# Patient Record
Sex: MALE | Race: WHITE | NOT HISPANIC OR LATINO | Employment: FULL TIME | URBAN - NONMETROPOLITAN AREA
[De-identification: names, ages, dates, MRNs, and addresses within clinical notes are randomized per-mention and may not be internally consistent; named-entity substitution may affect disease eponyms.]

---

## 2017-01-20 ENCOUNTER — OFFICE VISIT (OUTPATIENT)
Dept: UROLOGY | Facility: CLINIC | Age: 47
End: 2017-01-20

## 2017-01-20 DIAGNOSIS — N40.1 BPH (BENIGN PROSTATIC HYPERTROPHY) WITH URINARY OBSTRUCTION: ICD-10-CM

## 2017-01-20 DIAGNOSIS — E29.1 HYPOGONADISM MALE: Primary | ICD-10-CM

## 2017-01-20 DIAGNOSIS — N13.8 BPH (BENIGN PROSTATIC HYPERTROPHY) WITH URINARY OBSTRUCTION: ICD-10-CM

## 2017-01-20 PROCEDURE — 99213 OFFICE O/P EST LOW 20 MIN: CPT | Performed by: UROLOGY

## 2017-01-20 NOTE — PROGRESS NOTES
Chief Complaint  Hypogonadism (FUP 1 MONTH WITH TESTO LEVEL.)        EVAN Lovelace is a 46 y.o. male who turns today for follow-up of hypogonadism and low testosterone level.  Month ago he had Testopel pellets inserted and returns today with a significant clinical improvement in terms of energy stamina and attitude  He is therefore anxious to continue the supplement.  He has noticed a decline in the volume of his testicles and he was fore-warned about the diminution in his endogenous production with supplement.    There were no vitals filed for this visit.    Past Medical History  Past Medical History   Diagnosis Date   • Anxiety    • Mood changes        Past Surgical History  No past surgical history on file.    Medications  has a current medication list which includes the following prescription(s): buspirone and hydroxyzine, and the following Facility-Administered Medications: testosterone cypionate.      Allergies  No Known Allergies    Social History  Social History     Social History Narrative       Family History  He has no family history of bladder or kidney cancer  He has no family history of kidney stones      AUA Symptom Score:      Review of Systems  Review of Systems   Constitutional: Negative.    Genitourinary: Negative.    All other systems reviewed and are negative.      Physical Exam  Physical Exam    Labs Recent and today in the office:  Results for orders placed or performed in visit on 10/10/16   Estradiol   Result Value Ref Range    Estradiol 23.0 pg/mL   PSA   Result Value Ref Range    PSA 0.500 0.000 - 4.000 ng/mL   Testosterone (Free & Total), LC / MS   Result Value Ref Range    Testosterone, Total 272.9 (L) 348.0 - 1197.0 ng/dL    Testosterone, Free 5.3 (L) 6.8 - 21.5 pg/mL   Luteinizing Hormone   Result Value Ref Range    LH 2.60 mIU/mL   POC Urinalysis Dipstick, Automated   Result Value Ref Range    Color Yellow Yellow, Straw, Dark Yellow, Tara    Clarity, UA Clear Clear    Glucose, UA  Negative Negative mg/dL    Bilirubin Negative Negative    Ketones, UA Negative Negative    Specific Gravity  1.030 1.005 - 1.030    Blood, UA Negative Negative    pH, Urine 5.0 5.0 - 8.0    Protein, POC Negative Negative mg/dL    Urobilinogen, UA Normal Normal    Leukocytes Negative Negative    Nitrite, UA Negative Negative         Assessment & Plan  His recent testosterone level looked great at 486 and correlates with his clinical response.  PSA is unchanged 0.5.  I suggested he return in 3 months with blood work and if appropriate he'll be due for his nextapplication of Testopel pellets.

## 2017-01-20 NOTE — MR AVS SNAPSHOT
Tari Lovelace   2017 11:00 AM   Office Visit    Dept Phone:  [phone]   Encounter #:  [id number]    Provider:  Himanshu Peoples MD   Department:  [facility] UROLOGY                Your Full Care Plan              Your Updated Medication List          This list is accurate as of: 17 11:59 AM.  Always use your most recent med list.                busPIRone 10 MG tablet   Commonly known as:  BUSPAR       hydrOXYzine 25 MG capsule   Commonly known as:  VISTARIL               You Were Diagnosed With        Codes Comments    Hypogonadism male    -  Primary ICD-10-CM: E29.1  ICD-9-CM: 257.2     BPH (benign prostatic hypertrophy) with urinary obstruction     ICD-10-CM: N40.1, N13.8  ICD-9-CM: 600.01, 599.69       Medications to be Given to You by a Medical Professional     Due       Frequency    10/10/2016 Testosterone Cypionate (DEPOTESTOTERONE CYPIONATE) injection 400 mg  Once      Instructions     None    Patient Instructions History      Upcoming Appointments     Visit Type Date Time Department    FOLLOW UP 2017 11:00 AM [facility]    FOLLOW UP 2017 10:00 AM [facility]      [facility] Signup     [facility] allows you to send messages to your doctor, view your test results, renew your prescriptions, schedule appointments, and more. To sign up, go to [email or web address] and click on the Sign Up Now link in the New User? box. Enter your [facility] Activation Code exactly as it appears below along with the last four digits of your Social Security Number and your Date of Birth () to complete the sign-up process. If you do not sign up before the expiration date, you must request a new code.    [facility] Activation Code: [email or web address]  Expires: 2/3/2017 11:59 AM    If you have questions, you can email [email or web address] or call [phone] to talk to our [facility] staff. Remember, [facility] is NOT to be used for  urgent needs. For medical emergencies, dial 911.               Other Info from Your Visit           Your Appointments     Apr 21, 2017 10:00 AM EDT   Follow Up with Himanshu Peoples MD   [facility] UROLOGY (--)    [address] [address] [phone]           Arrive 15 minutes prior to appointment.              Allergies     No Known Allergies      Reason for Visit     Hypogonadism FUP 1 MONTH WITH TESTO LEVEL.      Vital Signs     Smoking Status                   Former Smoker           Problems and Diagnoses Noted     Hypogonadism male    -  Primary    Enlarged prostate with urinary obstruction

## 2017-04-21 ENCOUNTER — OFFICE VISIT (OUTPATIENT)
Dept: UROLOGY | Facility: CLINIC | Age: 47
End: 2017-04-21

## 2017-04-21 DIAGNOSIS — E29.1 HYPOGONADISM IN MALE: ICD-10-CM

## 2017-04-21 PROCEDURE — S0189 TESTOSTERONE PELLET 75 MG: HCPCS | Performed by: UROLOGY

## 2017-04-21 PROCEDURE — 99213 OFFICE O/P EST LOW 20 MIN: CPT | Performed by: UROLOGY

## 2017-04-21 PROCEDURE — 11980 IMPLANT HORMONE PELLET(S): CPT | Performed by: UROLOGY

## 2017-04-21 NOTE — PROGRESS NOTES
"Chief Complaint  Hypogonadism (fup 3 months with labs.)        EVAN Lovelace is a 47 y.o. male who returns today for follow-up of hypogonadism with a low testosterone level who has achieved great clinical benefit from supplement.  He states that for the past few weeks he has felt like he was \"running out of gas\".  This is confirmed by his recent blood work which indicates his testosterone level is back less than 200.  He's obviously not capable of making enough testosterone on his own.  We got a nice blood level over 500 one month after the insertion of 12 Testopel pellets 4 months ago.  He's anxious to continue the treatment.  Recent estradiol and CBC were within normal limits.    There were no vitals filed for this visit.    Past Medical History  Past Medical History:   Diagnosis Date   • Anxiety    • Mood changes        Past Surgical History  No past surgical history on file.    Medications  has a current medication list which includes the following prescription(s): buspirone and hydroxyzine, and the following Facility-Administered Medications: testosterone cypionate.      Allergies  No Known Allergies    Social History  Social History     Social History Narrative       Family History  He has no family history of bladder or kidney cancer  He has no family history of kidney stones      AUA Symptom Score:      Review of Systems  Review of Systems   Constitutional: Negative.    Genitourinary: Negative.    All other systems reviewed and are negative.      Physical Exam  Physical Exam    Labs Recent and today in the office:  Results for orders placed or performed in visit on 10/10/16   Estradiol   Result Value Ref Range    Estradiol 23.0 pg/mL   PSA   Result Value Ref Range    PSA 0.500 0.000 - 4.000 ng/mL   Testosterone (Free & Total), LC / MS   Result Value Ref Range    Testosterone, Total 272.9 (L) 348.0 - 1197.0 ng/dL    Testosterone, Free 5.3 (L) 6.8 - 21.5 pg/mL   Luteinizing Hormone   Result Value Ref Range    " LH 2.60 mIU/mL   POC Urinalysis Dipstick, Automated   Result Value Ref Range    Color Yellow Yellow, Straw, Dark Yellow, Tara    Clarity, UA Clear Clear    Glucose, UA Negative Negative mg/dL    Bilirubin Negative Negative    Ketones, UA Negative Negative    Specific Gravity  1.030 1.005 - 1.030    Blood, UA Negative Negative    pH, Urine 5.0 5.0 - 8.0    Protein, POC Negative Negative mg/dL    Urobilinogen, UA Normal Normal    Leukocytes Negative Negative    Nitrite, UA Negative Negative     Testopel Subcutaneous hormone pellet implantation:    The patient was placed on the exam table in the supine position.  The upper outer quadrant of the hip was identified for insertion.  The area was then prepped with Betadine and injected with 7ml of Lidocaine 2% with Epinephrine to anesthetize superficially and then distally along the trocar tract.  A 3mm incision was made using #11 blade scalpel.  The trocar with a sharp-ended stylet was inserted into the subcutaneous tissue in line with the femur.  The sharp stylet was withdrawn and the Testopel pellets were placed into the well of the trocar.  Testopel was advanced into the tissue using blunt-ended stylet.  A total of 12 pellets were inserted. For the J-code of  the NDC # for the pellets is 00272-778-03. The trocar was removed and the incision was closed using Steri-strips.  The wound area was cleansed to remove the Betadine and was covered with Steri-strips with an outer Band-aid.  Two subcutaneous tract were developed.  Careful inspection of the area was done.            Assessment & Plan  The patient was informed of the post-procedure instructions.  He will return in  4 months with blood work to assure safety and to determine scheduling of the next insertion.

## 2017-08-25 ENCOUNTER — OFFICE VISIT (OUTPATIENT)
Dept: UROLOGY | Facility: CLINIC | Age: 47
End: 2017-08-25

## 2017-08-25 VITALS
TEMPERATURE: 98.4 F | WEIGHT: 233 LBS | DIASTOLIC BLOOD PRESSURE: 82 MMHG | OXYGEN SATURATION: 97 % | HEIGHT: 75 IN | BODY MASS INDEX: 28.97 KG/M2 | HEART RATE: 86 BPM | SYSTOLIC BLOOD PRESSURE: 137 MMHG

## 2017-08-25 DIAGNOSIS — E29.1 HYPOGONADISM IN MALE: Primary | ICD-10-CM

## 2017-08-25 DIAGNOSIS — R79.89 LOW TESTOSTERONE: ICD-10-CM

## 2017-08-25 PROCEDURE — 99213 OFFICE O/P EST LOW 20 MIN: CPT | Performed by: UROLOGY

## 2017-08-25 PROCEDURE — 96372 THER/PROPH/DIAG INJ SC/IM: CPT | Performed by: UROLOGY

## 2017-08-25 RX ORDER — ANASTROZOLE 1 MG/1
1 TABLET ORAL WEEKLY
Qty: 12 TABLET | Refills: 3 | Status: SHIPPED | OUTPATIENT
Start: 2017-08-25 | End: 2017-10-17 | Stop reason: SDUPTHER

## 2017-08-25 RX ORDER — TESTOSTERONE CYPIONATE 200 MG/ML
400 INJECTION, SOLUTION INTRAMUSCULAR ONCE
Status: COMPLETED | OUTPATIENT
Start: 2017-08-25 | End: 2017-08-25

## 2017-08-25 RX ADMIN — TESTOSTERONE CYPIONATE 400 MG: 200 INJECTION, SOLUTION INTRAMUSCULAR at 13:52

## 2017-08-25 NOTE — PROGRESS NOTES
Chief Complaint  Hypogonadism (4 month fup.)        EVAN Lovelace is a 47 y.o. male who returns today for follow-up with a history of hypogonadism and low testosterone level.  He received atrophic clinical benefit from supplement with Testopel pellets in the past with normal blood work indicating little risk.  Unfortunately he's lost his health insurance and his pain cast for everything.  He did not get his blood work prior to this appointment since it was $600.  He actually has not received any testosterone ever since his last pellet injection months ago.  He states his symptoms have returned with a vengeance and he can hardly stay awake and has no energy.    Vitals:    08/25/17 1024   BP: 137/82   Pulse: 86   Temp: 98.4 °F (36.9 °C)   SpO2: 97%       Past Medical History  Past Medical History:   Diagnosis Date   • Anxiety    • Mood changes        Past Surgical History  No past surgical history on file.    Medications  has a current medication list which includes the following prescription(s): buspirone and hydroxyzine, and the following Facility-Administered Medications: testosterone cypionate.      Allergies  No Known Allergies    Social History  Social History     Social History Narrative       Family History  He has no family history of bladder or kidney cancer  He has no family history of kidney stones      AUA Symptom Score:      Review of Systems  Review of Systems   Constitutional: Positive for activity change and fatigue.   Genitourinary: Negative.    All other systems reviewed and are negative.      Physical Exam  Physical Exam    Labs Recent and today in the office:  Results for orders placed or performed in visit on 10/10/16   Estradiol   Result Value Ref Range    Estradiol 23.0 pg/mL   PSA   Result Value Ref Range    PSA 0.500 0.000 - 4.000 ng/mL   Testosterone (Free & Total), LC / MS   Result Value Ref Range    Testosterone, Total 272.9 (L) 348.0 - 1197.0 ng/dL    Testosterone, Free 5.3 (L) 6.8 - 21.5  pg/mL   Luteinizing Hormone   Result Value Ref Range    LH 2.60 mIU/mL   POC Urinalysis Dipstick, Automated   Result Value Ref Range    Color Yellow Yellow, Straw, Dark Yellow, Tara    Clarity, UA Clear Clear    Glucose, UA Negative Negative mg/dL    Bilirubin Negative Negative    Ketones, UA Negative Negative    Specific Gravity  1.030 1.005 - 1.030    Blood, UA Negative Negative    pH, Urine 5.0 5.0 - 8.0    Protein, POC Negative Negative mg/dL    Urobilinogen, UA Normal Normal    Leukocytes Negative Negative    Nitrite, UA Negative Negative         Assessment & Plan  He will start receiving Depo-testosterone injections through the office and return in 3 months for follow-up.  He has insurance coverage will perform blood work then but otherwise which are probably wait 6.  I suggested going ahead and starting Arimidex to prevent an elevated estrogen level.  I suggested he be a blood donor to prevent polycythemia.

## 2017-09-12 DIAGNOSIS — E29.1 HYPOGONADISM IN MALE: ICD-10-CM

## 2017-09-12 DIAGNOSIS — R79.89 LOW TESTOSTERONE: Primary | ICD-10-CM

## 2017-09-12 RX ORDER — TESTOSTERONE CYPIONATE 200 MG/ML
400 INJECTION, SOLUTION INTRAMUSCULAR
Status: DISCONTINUED | OUTPATIENT
Start: 2017-09-12 | End: 2017-09-13

## 2017-09-13 ENCOUNTER — CLINICAL SUPPORT (OUTPATIENT)
Dept: UROLOGY | Facility: CLINIC | Age: 47
End: 2017-09-13

## 2017-09-13 ENCOUNTER — TELEPHONE (OUTPATIENT)
Dept: UROLOGY | Facility: CLINIC | Age: 47
End: 2017-09-13

## 2017-09-13 DIAGNOSIS — E29.1 HYPOGONADISM MALE: ICD-10-CM

## 2017-09-13 DIAGNOSIS — E29.1 HYPOGONADISM IN MALE: Primary | ICD-10-CM

## 2017-09-13 PROCEDURE — 96372 THER/PROPH/DIAG INJ SC/IM: CPT | Performed by: UROLOGY

## 2017-09-13 RX ORDER — TESTOSTERONE CYPIONATE 200 MG/ML
400 INJECTION, SOLUTION INTRAMUSCULAR
Qty: 20 ML | Refills: 1 | Status: SHIPPED | OUTPATIENT
Start: 2017-09-13 | End: 2017-09-23 | Stop reason: SDUPTHER

## 2017-09-13 RX ADMIN — TESTOSTERONE CYPIONATE 400 MG: 200 INJECTION, SOLUTION INTRAMUSCULAR at 09:34

## 2017-09-23 DIAGNOSIS — E29.1 HYPOGONADISM IN MALE: ICD-10-CM

## 2017-09-23 RX ORDER — TESTOSTERONE CYPIONATE 200 MG/ML
400 INJECTION, SOLUTION INTRAMUSCULAR
Qty: 20 ML | Refills: 1 | Status: SHIPPED | OUTPATIENT
Start: 2017-09-23 | End: 2018-01-04 | Stop reason: SDUPTHER

## 2017-10-17 DIAGNOSIS — E29.1 HYPOGONADISM IN MALE: ICD-10-CM

## 2017-10-17 RX ORDER — ANASTROZOLE 1 MG/1
1 TABLET ORAL WEEKLY
Qty: 12 TABLET | Refills: 3 | Status: SHIPPED | OUTPATIENT
Start: 2017-10-17 | End: 2017-11-28 | Stop reason: SDUPTHER

## 2017-11-28 ENCOUNTER — OFFICE VISIT (OUTPATIENT)
Dept: UROLOGY | Facility: CLINIC | Age: 47
End: 2017-11-28

## 2017-11-28 VITALS
HEIGHT: 75 IN | TEMPERATURE: 98.6 F | HEART RATE: 86 BPM | BODY MASS INDEX: 28.97 KG/M2 | WEIGHT: 233 LBS | OXYGEN SATURATION: 97 % | DIASTOLIC BLOOD PRESSURE: 78 MMHG | SYSTOLIC BLOOD PRESSURE: 134 MMHG

## 2017-11-28 DIAGNOSIS — R79.89 LOW TESTOSTERONE: ICD-10-CM

## 2017-11-28 DIAGNOSIS — E29.1 HYPOGONADISM IN MALE: Primary | ICD-10-CM

## 2017-11-28 LAB
BILIRUB BLD-MCNC: NEGATIVE MG/DL
CLARITY, POC: CLEAR
COLOR UR: YELLOW
GLUCOSE UR STRIP-MCNC: NEGATIVE MG/DL
KETONES UR QL: NEGATIVE
LEUKOCYTE EST, POC: NEGATIVE
NITRITE UR-MCNC: NEGATIVE MG/ML
PH UR: 6 [PH] (ref 5–8)
PROT UR STRIP-MCNC: ABNORMAL MG/DL
RBC # UR STRIP: NEGATIVE /UL
SP GR UR: 1.03 (ref 1–1.03)
UROBILINOGEN UR QL: ABNORMAL

## 2017-11-28 PROCEDURE — 99213 OFFICE O/P EST LOW 20 MIN: CPT | Performed by: UROLOGY

## 2017-11-28 RX ORDER — CYANOCOBALAMIN 1000 UG/ML
INJECTION, SOLUTION INTRAMUSCULAR; SUBCUTANEOUS
Refills: 0 | COMMUNITY
Start: 2017-11-15

## 2017-11-28 RX ORDER — DULOXETIN HYDROCHLORIDE 20 MG/1
20 CAPSULE, DELAYED RELEASE ORAL DAILY
COMMUNITY

## 2017-11-28 RX ORDER — ANASTROZOLE 1 MG/1
1 TABLET ORAL WEEKLY
Qty: 12 TABLET | Refills: 3 | Status: SHIPPED | OUTPATIENT
Start: 2017-11-28 | End: 2018-04-02 | Stop reason: SDUPTHER

## 2017-11-28 RX ORDER — DULOXETIN HYDROCHLORIDE 30 MG/1
CAPSULE, DELAYED RELEASE ORAL
Refills: 2 | COMMUNITY
Start: 2017-11-15

## 2017-11-28 RX ORDER — SYRINGE WITH NEEDLE, 1 ML 25GX5/8"
SYRINGE, EMPTY DISPOSABLE MISCELLANEOUS
Refills: 0 | COMMUNITY
Start: 2017-11-15

## 2017-11-28 NOTE — PROGRESS NOTES
"Chief Complaint  Hypogonadism (Patient is here for 3 month fup with labs.) and low test        HPI  Radu is a 47 y.o. male who returns today for follow-up of hypogonadism and low testosterone level receiving a great clinical benefit from supplement and anxious to continue.  He's had problems with his insurance and finances are a concern.  He is currently self administering Depo-testosterone 400 mg every 21 days.  Recent blood work indicates it is safe to continue the supplement with the exception of polycythemia.  With a hemoglobin of 17.6 I have recommended a therapeutic phlebotomy or blood donation.     Vitals:    11/28/17 1011   BP: 134/78   Pulse: 86   Temp: 98.6 °F (37 °C)   SpO2: 97%       Past Medical History  Past Medical History:   Diagnosis Date   • Anxiety    • Mood changes        Past Surgical History  No past surgical history on file.    Medications  has a current medication list which includes the following prescription(s): duloxetine, anastrozole, b-d 3cc luer-lisa syr 25gx1/2\", buspirone, cyanocobalamin, duloxetine, hydroxyzine, and testosterone cypionate.      Allergies  No Known Allergies    Social History  Social History     Social History Narrative       Family History  He has no family history of bladder or kidney cancer  He has no family history of kidney stones      AUA Symptom Score:      Review of Systems  Review of Systems   Constitutional: Negative.    Genitourinary: Negative.    All other systems reviewed and are negative.      Physical Exam  Physical Exam    Labs Recent and today in the office:  Results for orders placed or performed in visit on 11/28/17   POC Urinalysis Dipstick, Automated   Result Value Ref Range    Color Yellow Yellow, Straw, Dark Yellow, Tara    Clarity, UA Clear Clear    Glucose, UA Negative Negative, 1000 mg/dL (3+) mg/dL    Bilirubin Negative Negative    Ketones, UA Negative Negative    Specific Gravity  1.030 1.005 - 1.030    Blood, UA Negative Negative    pH, " Urine 6.0 5.0 - 8.0    Protein, POC Trace (A) Negative mg/dL    Urobilinogen, UA 1 E.U./dL  (A) Normal    Leukocytes Negative Negative    Nitrite, UA Negative Negative         Assessment & Plan  Hypogonadism: He will donated positive blood or return for therapeutic phlebotomy but otherwise can continue the self-administered Depo-testosterone 400 mg every 21 days.  He'll also continue the Arimidex 1 mg weekly to prevent the conversion of estradiol.

## 2017-12-07 ENCOUNTER — LAB (OUTPATIENT)
Dept: LAB | Facility: HOSPITAL | Age: 47
End: 2017-12-07
Attending: UROLOGY

## 2017-12-07 DIAGNOSIS — D75.1 POLYCYTHEMIA: Primary | ICD-10-CM

## 2017-12-07 LAB
DEPRECATED RDW RBC AUTO: 42.9 FL (ref 37–54)
ERYTHROCYTE [DISTWIDTH] IN BLOOD BY AUTOMATED COUNT: 13.2 % (ref 11.5–14.5)
HCT VFR BLD AUTO: 50 % (ref 42–52)
HGB BLD-MCNC: 16.6 G/DL (ref 14–18)
MCH RBC QN AUTO: 29.6 PG (ref 27–31)
MCHC RBC AUTO-ENTMCNC: 33.2 G/DL (ref 30–37)
MCV RBC AUTO: 89.1 FL (ref 80–94)
PLATELET # BLD AUTO: 242 10*3/MM3 (ref 130–400)
PMV BLD AUTO: 9 FL (ref 6–12)
POST-BLOOD PRESSURE: NORMAL
PRE-BLOOD PRESSURE: NORMAL
PRE-HCT: 50
PRE-HGB: 16.6
PULSE: 67
RBC # BLD AUTO: 5.61 10*6/MM3 (ref 4.7–6.1)
VOLUME COLLECTED: NORMAL
WBC NRBC COR # BLD: 6.09 10*3/MM3 (ref 4.8–10.8)

## 2017-12-07 PROCEDURE — 99195 PHLEBOTOMY: CPT | Performed by: UROLOGY

## 2017-12-07 PROCEDURE — 36415 COLL VENOUS BLD VENIPUNCTURE: CPT

## 2017-12-07 PROCEDURE — 85027 COMPLETE CBC AUTOMATED: CPT

## 2018-01-04 ENCOUNTER — TELEPHONE (OUTPATIENT)
Dept: UROLOGY | Facility: CLINIC | Age: 48
End: 2018-01-04

## 2018-01-04 DIAGNOSIS — E29.1 HYPOGONADISM IN MALE: ICD-10-CM

## 2018-01-04 RX ORDER — TESTOSTERONE CYPIONATE 200 MG/ML
400 INJECTION, SOLUTION INTRAMUSCULAR
Qty: 20 ML | Refills: 1 | Status: SHIPPED | OUTPATIENT
Start: 2018-01-04 | End: 2018-04-02 | Stop reason: SDUPTHER

## 2018-01-04 NOTE — TELEPHONE ENCOUNTER
Pt requesting refills of his depo testosterone refilled   . He is not due to come back until march and needs medication refilled please

## 2018-03-26 DIAGNOSIS — E29.1 TESTICULAR HYPOGONADISM: Primary | ICD-10-CM

## 2018-03-26 DIAGNOSIS — Z12.5 SCREENING FOR PROSTATE CANCER: ICD-10-CM

## 2018-03-28 ENCOUNTER — LAB (OUTPATIENT)
Dept: LAB | Facility: HOSPITAL | Age: 48
End: 2018-03-28
Attending: UROLOGY

## 2018-03-28 DIAGNOSIS — Z12.5 SCREENING FOR PROSTATE CANCER: ICD-10-CM

## 2018-03-28 DIAGNOSIS — E29.1 TESTICULAR HYPOGONADISM: ICD-10-CM

## 2018-03-28 LAB
BASOPHILS # BLD AUTO: 0.08 10*3/MM3 (ref 0–0.2)
BASOPHILS NFR BLD AUTO: 0.8 % (ref 0–2.5)
DEPRECATED RDW RBC AUTO: 44.6 FL (ref 37–54)
EOSINOPHIL # BLD AUTO: 0.22 10*3/MM3 (ref 0–0.7)
EOSINOPHIL NFR BLD AUTO: 2.3 % (ref 0–7)
ERYTHROCYTE [DISTWIDTH] IN BLOOD BY AUTOMATED COUNT: 13.8 % (ref 11.5–14.5)
HCT VFR BLD AUTO: 49.3 % (ref 42–52)
HGB BLD-MCNC: 16.4 G/DL (ref 14–18)
IMM GRANULOCYTES # BLD: 0.03 10*3/MM3 (ref 0–0.06)
IMM GRANULOCYTES NFR BLD: 0.3 % (ref 0–0.6)
LYMPHOCYTES # BLD AUTO: 2.72 10*3/MM3 (ref 0.6–3.4)
LYMPHOCYTES NFR BLD AUTO: 28.3 % (ref 10–50)
MCH RBC QN AUTO: 29.7 PG (ref 27–31)
MCHC RBC AUTO-ENTMCNC: 33.3 G/DL (ref 30–37)
MCV RBC AUTO: 89.3 FL (ref 80–94)
MONOCYTES # BLD AUTO: 0.98 10*3/MM3 (ref 0–0.9)
MONOCYTES NFR BLD AUTO: 10.2 % (ref 0–12)
NEUTROPHILS # BLD AUTO: 5.58 10*3/MM3 (ref 2–6.9)
NEUTROPHILS NFR BLD AUTO: 58.1 % (ref 37–80)
NRBC BLD MANUAL-RTO: 0 /100 WBC (ref 0–0)
PLATELET # BLD AUTO: 288 10*3/MM3 (ref 130–400)
PMV BLD AUTO: 9.6 FL (ref 6–12)
PSA SERPL-MCNC: 1.35 NG/ML (ref 0.06–4)
RBC # BLD AUTO: 5.52 10*6/MM3 (ref 4.7–6.1)
WBC NRBC COR # BLD: 9.61 10*3/MM3 (ref 4.8–10.8)

## 2018-03-28 PROCEDURE — 36415 COLL VENOUS BLD VENIPUNCTURE: CPT

## 2018-03-28 PROCEDURE — 85025 COMPLETE CBC W/AUTO DIFF WBC: CPT

## 2018-03-28 PROCEDURE — 82670 ASSAY OF TOTAL ESTRADIOL: CPT

## 2018-03-28 PROCEDURE — 84403 ASSAY OF TOTAL TESTOSTERONE: CPT

## 2018-03-28 PROCEDURE — 84153 ASSAY OF PSA TOTAL: CPT

## 2018-03-29 LAB
ESTRADIOL SERPL HS-MCNC: 79.4 PG/ML (ref 7.6–42.6)
TESTOST SERPL-MCNC: 892 NG/DL (ref 264–916)

## 2018-04-02 ENCOUNTER — OFFICE VISIT (OUTPATIENT)
Dept: UROLOGY | Facility: CLINIC | Age: 48
End: 2018-04-02

## 2018-04-02 VITALS
OXYGEN SATURATION: 98 % | WEIGHT: 233 LBS | HEIGHT: 75 IN | TEMPERATURE: 98.3 F | BODY MASS INDEX: 28.97 KG/M2 | SYSTOLIC BLOOD PRESSURE: 128 MMHG | HEART RATE: 77 BPM | DIASTOLIC BLOOD PRESSURE: 74 MMHG

## 2018-04-02 DIAGNOSIS — E28.0 ESTRADIOL EXCESS: ICD-10-CM

## 2018-04-02 DIAGNOSIS — E29.1 HYPOGONADISM IN MALE: Primary | ICD-10-CM

## 2018-04-02 DIAGNOSIS — N52.9 ERECTILE DYSFUNCTION, UNSPECIFIED ERECTILE DYSFUNCTION TYPE: ICD-10-CM

## 2018-04-02 DIAGNOSIS — D75.1 POLYCYTHEMIA: ICD-10-CM

## 2018-04-02 LAB
BILIRUB BLD-MCNC: NEGATIVE MG/DL
CLARITY, POC: CLEAR
COLOR UR: YELLOW
GLUCOSE UR STRIP-MCNC: NEGATIVE MG/DL
KETONES UR QL: NEGATIVE
LEUKOCYTE EST, POC: NEGATIVE
NITRITE UR-MCNC: NEGATIVE MG/ML
PH UR: 6 [PH] (ref 5–8)
PROT UR STRIP-MCNC: NEGATIVE MG/DL
RBC # UR STRIP: NEGATIVE /UL
SP GR UR: 1.03 (ref 1–1.03)
UROBILINOGEN UR QL: NORMAL

## 2018-04-02 PROCEDURE — 99214 OFFICE O/P EST MOD 30 MIN: CPT | Performed by: UROLOGY

## 2018-04-02 RX ORDER — ANASTROZOLE 1 MG/1
1 TABLET ORAL 2 TIMES WEEKLY
Qty: 24 TABLET | Refills: 3 | Status: SHIPPED | OUTPATIENT
Start: 2018-04-02 | End: 2018-12-21 | Stop reason: SDUPTHER

## 2018-04-02 RX ORDER — TESTOSTERONE CYPIONATE 200 MG/ML
400 INJECTION, SOLUTION INTRAMUSCULAR
Qty: 20 ML | Refills: 1 | Status: SHIPPED | OUTPATIENT
Start: 2018-04-02 | End: 2018-12-21 | Stop reason: SDUPTHER

## 2018-04-02 NOTE — PROGRESS NOTES
"Chief Complaint  Hypogonadism (3 month fup with labs)        EVAN Lovelace is a 48 y.o. male who returns today for follow-up of hypogonadism and low testosterone level.  He is also having problems with erectile dysfunction after going through divorce and December.  He has been taking Cymbalta which caused ejaculatory dysfunction and he's been able to discontinue that.  He is continuing his Depo-testosterone self injections at home 400 mg every 21 days with good effect.  He is anxious to continue the supplement.  Once again we have reviewed the risks and benefits of androgen supplement and the need to monitor him for toxicity.  He ran out of his Arimidex and his estrogen level is significantly elevated so the importance of taking this is reviewed.  He also has a problem for polycythemia that required periodic blood donation.    Vitals:    04/02/18 0918   BP: 128/74   Pulse: 77   Temp: 98.3 °F (36.8 °C)   SpO2: 98%       Past Medical History  Past Medical History:   Diagnosis Date   • Anxiety    • Mood changes        Past Surgical History  No past surgical history on file.    Medications  has a current medication list which includes the following prescription(s): anastrozole, b-d 3cc luer-lisa syr 25gx1/2\", buspirone, cyanocobalamin, duloxetine, duloxetine, hydroxyzine, and testosterone cypionate.      Allergies  No Known Allergies    Social History  Social History     Social History Narrative   • No narrative on file       Family History  He has no family history of bladder or kidney cancer  He has no family history of kidney stones      AUA Symptom Score:      Review of Systems  Review of Systems    Physical Exam  Physical Exam   Constitutional: He is oriented to person, place, and time. He appears well-developed and well-nourished.   HENT:   Head: Normocephalic and atraumatic.   Neck: Normal range of motion.   Pulmonary/Chest: Effort normal. No respiratory distress.   Abdominal: Soft. He exhibits no distension and no " mass. There is no tenderness. No hernia.   Genitourinary: Rectum normal and prostate normal.   Musculoskeletal: Normal range of motion.   Lymphadenopathy:     He has no cervical adenopathy.   Neurological: He is alert and oriented to person, place, and time.   Skin: Skin is warm and dry.   Psychiatric: He has a normal mood and affect. His behavior is normal.   Vitals reviewed.      Labs Recent and today in the office:  Results for orders placed or performed in visit on 04/02/18   POC Urinalysis Dipstick, Automated   Result Value Ref Range    Color Yellow Yellow, Straw, Dark Yellow, Tara    Clarity, UA Clear Clear    Glucose, UA Negative Negative, 1000 mg/dL (3+) mg/dL    Bilirubin Negative Negative    Ketones, UA Negative Negative    Specific Gravity  1.030 1.005 - 1.030    Blood, UA Negative Negative    pH, Urine 6.0 5.0 - 8.0    Protein, POC Negative Negative mg/dL    Urobilinogen, UA Normal Normal    Leukocytes Negative Negative    Nitrite, UA Negative Negative         Assessment & Plan  1 hypogonadism: The need to monitor for toxicity is reviewed in detail with the patient.  He'll continue his self injections 400 mg every 21 days. He is instructed to discontinue his testosterone anytime he runs out of Arimidex.  This is prescribed at 2 times per week for the next month and then he'll return to 1 mg weekly.     He is instructed to discontinue his testosterone anytime he runs out of Arimidex.  This is prescribed at 2 times per week for the next month and then he'll return to 1 mg weekly.    #2 estradiol excess: He'll take Arimidex 1 mg 2 times per week.    #3 polycythemia: Currently his blood count is okay but he'll continue to be a blood donor to [facility].    #4 erectile dysfunction/ejaculatory dysfunction: We reviewed the likelihood of psychological factors place since this happened with new sexual contacts immediately after divorce.  His ejaculation is improved by stopping Cymbalta.

## 2018-07-05 ENCOUNTER — OFFICE VISIT (OUTPATIENT)
Dept: UROLOGY | Facility: CLINIC | Age: 48
End: 2018-07-05

## 2018-07-05 VITALS
SYSTOLIC BLOOD PRESSURE: 126 MMHG | TEMPERATURE: 98.2 F | DIASTOLIC BLOOD PRESSURE: 77 MMHG | HEART RATE: 85 BPM | OXYGEN SATURATION: 99 % | RESPIRATION RATE: 16 BRPM

## 2018-07-05 DIAGNOSIS — E29.1 TESTICULAR HYPOGONADISM: Primary | ICD-10-CM

## 2018-07-05 DIAGNOSIS — N52.01 ERECTILE DYSFUNCTION DUE TO ARTERIAL INSUFFICIENCY: ICD-10-CM

## 2018-07-05 LAB
BILIRUB BLD-MCNC: NEGATIVE MG/DL
CLARITY, POC: CLEAR
COLOR UR: YELLOW
GLUCOSE UR STRIP-MCNC: NEGATIVE MG/DL
KETONES UR QL: NEGATIVE
LEUKOCYTE EST, POC: NEGATIVE
NITRITE UR-MCNC: NEGATIVE MG/ML
PH UR: 6 [PH] (ref 5–8)
PROT UR STRIP-MCNC: NEGATIVE MG/DL
RBC # UR STRIP: NEGATIVE /UL
SP GR UR: 1.02 (ref 1–1.03)
UROBILINOGEN UR QL: NORMAL

## 2018-07-05 PROCEDURE — 99214 OFFICE O/P EST MOD 30 MIN: CPT | Performed by: UROLOGY

## 2018-07-05 RX ORDER — SILDENAFIL CITRATE 20 MG/1
60 TABLET ORAL DAILY PRN
Qty: 30 TABLET | Refills: 11 | Status: SHIPPED | OUTPATIENT
Start: 2018-07-05 | End: 2018-12-21 | Stop reason: SDUPTHER

## 2018-07-05 NOTE — PROGRESS NOTES
"Chief Complaint  Hypogonadism (3 months with labs.)        EVAN Lovelace is a 48 y.o. male who up of hypogonadism and low testosterone level achieving a good results from self injections at home of Depo-testosterone.  He did have a problem with elevated estradiol but after changing his dose on Arimidex it has dropped from 80 down to 10.    New concern today is erectile dysfunction:The patient is informed that the most common cause of erectile dysfunction is insufficient blood flow.  Atherosclerosis is extremely common in Americans who eat a diet high in animal fat and get insufficient exercise.  Patients who have hyperlipidemia and smoke cigarettes compound this affect.  The concept of diminished inflow from arterial sclerosis as well as diminished effectiveness of venous polsters that retain the blood in the cavernous spaces are presented to the patient.  The need for smoking cessation and eating a heart healthy diet along with increased physical activity is recommended in addition to the specific treatments for erectile dysfunction and hyperlipidemia.    Vitals:    07/05/18 0835   BP: 126/77   Pulse: 85   Resp: 16   Temp: 98.2 °F (36.8 °C)   SpO2: 99%       Past Medical History  Past Medical History:   Diagnosis Date   • Anxiety    • Mood changes (CMS/HCC)        Past Surgical History  No past surgical history on file.    Medications  has a current medication list which includes the following prescription(s): anastrozole, b-d 3cc luer-lisa syr 25gx1/2\", buspirone, cyanocobalamin, duloxetine, duloxetine, hydroxyzine, and testosterone cypionate.      Allergies  No Known Allergies    Social History  Social History     Social History Narrative   • No narrative on file       Family History  He has no family history of bladder or kidney cancer  He has no family history of kidney stones      AUA Symptom Score:      Review of Systems  Review of Systems   Constitutional: Negative.    Genitourinary: Negative.    All other " systems reviewed and are negative.      Physical Exam  Physical Exam    Labs Recent and today in the office:  Results for orders placed or performed in visit on 07/05/18   POC Urinalysis Dipstick, Automated   Result Value Ref Range    Color Yellow Yellow, Straw, Dark Yellow, Tara    Clarity, UA Clear Clear    Specific Gravity  1.020 1.005 - 1.030    pH, Urine 6.0 5.0 - 8.0    Leukocytes Negative Negative    Nitrite, UA Negative Negative    Protein, POC Negative Negative mg/dL    Glucose, UA Negative Negative, 1000 mg/dL (3+) mg/dL    Ketones, UA Negative Negative    Urobilinogen, UA Normal Normal    Bilirubin Negative Negative    Blood, UA Negative Negative         Assessment & Plan  #1 hypogonadism: At this point blood work indicates risk is minimal and it appears safe for him to continue the supplement and he is anxious to do so.  Continue the Arimidex 1 mg weekly and the self injections.    #2 erectile dysfunction: He is given an appointment to see Carlos to order an artificial erection device and generic sildenafil is prescribed.

## 2018-12-14 ENCOUNTER — LAB (OUTPATIENT)
Dept: LAB | Facility: HOSPITAL | Age: 48
End: 2018-12-14
Attending: UROLOGY

## 2018-12-14 DIAGNOSIS — E29.1 TESTICULAR HYPOGONADISM: ICD-10-CM

## 2018-12-14 DIAGNOSIS — Z12.5 SCREENING PSA (PROSTATE SPECIFIC ANTIGEN): ICD-10-CM

## 2018-12-14 LAB
BASOPHILS # BLD AUTO: 0.05 10*3/MM3 (ref 0–0.2)
BASOPHILS NFR BLD AUTO: 0.8 % (ref 0–2.5)
DEPRECATED RDW RBC AUTO: 42.3 FL (ref 37–54)
EOSINOPHIL # BLD AUTO: 0.13 10*3/MM3 (ref 0–0.7)
EOSINOPHIL NFR BLD AUTO: 2.2 % (ref 0–7)
ERYTHROCYTE [DISTWIDTH] IN BLOOD BY AUTOMATED COUNT: 13.1 % (ref 11.5–14.5)
HCT VFR BLD AUTO: 50 % (ref 42–52)
HGB BLD-MCNC: 17.1 G/DL (ref 14–18)
IMM GRANULOCYTES # BLD: 0.01 10*3/MM3 (ref 0–0.06)
IMM GRANULOCYTES NFR BLD: 0.2 % (ref 0–0.6)
LYMPHOCYTES # BLD AUTO: 2.32 10*3/MM3 (ref 0.6–3.4)
LYMPHOCYTES NFR BLD AUTO: 39.3 % (ref 10–50)
MCH RBC QN AUTO: 30.1 PG (ref 27–31)
MCHC RBC AUTO-ENTMCNC: 34.2 G/DL (ref 30–37)
MCV RBC AUTO: 88 FL (ref 80–94)
MONOCYTES # BLD AUTO: 0.36 10*3/MM3 (ref 0–0.9)
MONOCYTES NFR BLD AUTO: 6.1 % (ref 0–12)
NEUTROPHILS # BLD AUTO: 3.04 10*3/MM3 (ref 2–6.9)
NEUTROPHILS NFR BLD AUTO: 51.4 % (ref 37–80)
NRBC BLD MANUAL-RTO: 0 /100 WBC (ref 0–0)
PLATELET # BLD AUTO: 217 10*3/MM3 (ref 130–400)
PMV BLD AUTO: 9.7 FL (ref 6–12)
PSA SERPL-MCNC: 0.86 NG/ML (ref 0.06–4)
RBC # BLD AUTO: 5.68 10*6/MM3 (ref 4.7–6.1)
WBC NRBC COR # BLD: 5.91 10*3/MM3 (ref 4.8–10.8)

## 2018-12-14 PROCEDURE — 85025 COMPLETE CBC W/AUTO DIFF WBC: CPT

## 2018-12-14 PROCEDURE — G0103 PSA SCREENING: HCPCS

## 2018-12-14 PROCEDURE — 84403 ASSAY OF TOTAL TESTOSTERONE: CPT

## 2018-12-14 PROCEDURE — 82670 ASSAY OF TOTAL ESTRADIOL: CPT

## 2018-12-15 LAB
ESTRADIOL SERPL HS-MCNC: <5 PG/ML
TESTOST SERPL-MCNC: 545 NG/DL (ref 8–48)

## 2018-12-21 ENCOUNTER — OFFICE VISIT (OUTPATIENT)
Dept: UROLOGY | Facility: CLINIC | Age: 48
End: 2018-12-21

## 2018-12-21 VITALS
HEIGHT: 75 IN | SYSTOLIC BLOOD PRESSURE: 130 MMHG | DIASTOLIC BLOOD PRESSURE: 84 MMHG | HEART RATE: 84 BPM | OXYGEN SATURATION: 98 % | WEIGHT: 233 LBS | BODY MASS INDEX: 28.97 KG/M2

## 2018-12-21 DIAGNOSIS — E28.0 ESTRADIOL EXCESS: ICD-10-CM

## 2018-12-21 DIAGNOSIS — N52.01 ERECTILE DYSFUNCTION DUE TO ARTERIAL INSUFFICIENCY: ICD-10-CM

## 2018-12-21 DIAGNOSIS — D75.1 POLYCYTHEMIA: ICD-10-CM

## 2018-12-21 DIAGNOSIS — E29.1 TESTICULAR HYPOGONADISM: Primary | ICD-10-CM

## 2018-12-21 DIAGNOSIS — Z12.5 SCREENING PSA (PROSTATE SPECIFIC ANTIGEN): ICD-10-CM

## 2018-12-21 DIAGNOSIS — E29.1 HYPOGONADISM IN MALE: ICD-10-CM

## 2018-12-21 LAB
BILIRUB BLD-MCNC: NEGATIVE MG/DL
CLARITY, POC: CLEAR
COLOR UR: YELLOW
GLUCOSE UR STRIP-MCNC: NEGATIVE MG/DL
KETONES UR QL: NEGATIVE
LEUKOCYTE EST, POC: NEGATIVE
NITRITE UR-MCNC: NEGATIVE MG/ML
PH UR: 6.5 [PH] (ref 5–8)
PROT UR STRIP-MCNC: NEGATIVE MG/DL
RBC # UR STRIP: NEGATIVE /UL
SP GR UR: 1.01 (ref 1–1.03)
UROBILINOGEN UR QL: NORMAL

## 2018-12-21 PROCEDURE — 99214 OFFICE O/P EST MOD 30 MIN: CPT | Performed by: UROLOGY

## 2018-12-21 RX ORDER — TESTOSTERONE CYPIONATE 200 MG/ML
400 INJECTION, SOLUTION INTRAMUSCULAR
Qty: 20 ML | Refills: 1 | Status: SHIPPED | OUTPATIENT
Start: 2018-12-21

## 2018-12-21 RX ORDER — ANASTROZOLE 1 MG/1
1 TABLET ORAL WEEKLY
Qty: 24 TABLET | Refills: 3 | Status: SHIPPED | OUTPATIENT
Start: 2018-12-21

## 2018-12-21 RX ORDER — SILDENAFIL CITRATE 20 MG/1
60 TABLET ORAL DAILY PRN
Qty: 30 TABLET | Refills: 11 | Status: SHIPPED | OUTPATIENT
Start: 2018-12-21 | End: 2019-12-21

## 2018-12-21 NOTE — PROGRESS NOTES
"Chief Complaint  Hypogonadism (follow up with labs.)        EVAN Lovelace is a 48 y.o. male who returns today for follow-up of hypogonadism and low testosterone level having achieved a great benefit from supplement and anxious to continue.  He is currently injecting Depo-testosterone 400 mg every 21 days at home.  He's had a problem with polycythemia as well as estradiol conversion in the past.  He came by recently for blood work to monitor for toxicity and all parameters were within normal limits.    Vitals:    12/21/18 0912   BP: 130/84   Pulse: 84   SpO2: 98%       Past Medical History  Past Medical History:   Diagnosis Date   • Anxiety    • Mood changes        Past Surgical History  No past surgical history on file.    Medications  has a current medication list which includes the following prescription(s): anastrozole, b-d 3cc luer-lisa syr 25gx1/2\", buspirone, cyanocobalamin, duloxetine, duloxetine, hydroxyzine, sildenafil, and testosterone cypionate.      Allergies  No Known Allergies    Social History  Social History     Social History Narrative   • Not on file       Family History  He has no family history of bladder or kidney cancer  He has no family history of kidney stones      AUA Symptom Score:      Review of Systems  Review of Systems    Physical Exam  Physical Exam   Constitutional: He is oriented to person, place, and time. He appears well-developed and well-nourished.   HENT:   Head: Normocephalic and atraumatic.   Neck: Normal range of motion.   Pulmonary/Chest: Effort normal. No respiratory distress.   Abdominal: Soft. He exhibits no distension and no mass. There is no tenderness. No hernia.   Genitourinary: Rectum normal and prostate normal.   Musculoskeletal: Normal range of motion.   Lymphadenopathy:     He has no cervical adenopathy.   Neurological: He is alert and oriented to person, place, and time.   Skin: Skin is warm and dry.   Psychiatric: He has a normal mood and affect. His behavior is " normal.   Vitals reviewed.      Labs Recent and today in the office:  Results for orders placed or performed in visit on 12/21/18   POC Urinalysis Dipstick, Automated   Result Value Ref Range    Color Yellow Yellow, Straw, Dark Yellow, Tara    Clarity, UA Clear Clear    Specific Gravity  1.015 1.005 - 1.030    pH, Urine 6.5 5.0 - 8.0    Leukocytes Negative Negative    Nitrite, UA Negative Negative    Protein, POC Negative Negative mg/dL    Glucose, UA Negative Negative, 1000 mg/dL (3+) mg/dL    Ketones, UA Negative Negative    Urobilinogen, UA Normal Normal    Bilirubin Negative Negative    Blood, UA Negative Negative         Assessment & Plan  #1 hypogonadism: We reviewed the risks and benefits and he is anxious to continue the supplement.  He understands the need to closely monitor for toxicity of therapy and promises to return in 3 months    #2 polycythemia: Last hematocrit was 50 and he is normally a regular blood donor but missed his last opportunity.    #3 estradiol excess: This is been dangerously high in the past but is completely controlled on Arimidex.  He'll continue the 1 mg weekly    For erectile dysfunction: He requests reviewed feel on sildenafil and this is provided.  He states the 60 mg dosage she does not feel the need for the external erection device